# Patient Record
Sex: MALE | Race: WHITE | Employment: FULL TIME | URBAN - NONMETROPOLITAN AREA
[De-identification: names, ages, dates, MRNs, and addresses within clinical notes are randomized per-mention and may not be internally consistent; named-entity substitution may affect disease eponyms.]

---

## 2020-12-02 ENCOUNTER — HOSPITAL ENCOUNTER (EMERGENCY)
Age: 30
Discharge: HOME | End: 2020-12-02
Attending: EMERGENCY MEDICINE

## 2020-12-02 VITALS
HEART RATE: 72 BPM | WEIGHT: 145 LBS | BODY MASS INDEX: 21.98 KG/M2 | HEIGHT: 68 IN | TEMPERATURE: 97.8 F | OXYGEN SATURATION: 97 % | DIASTOLIC BLOOD PRESSURE: 85 MMHG | RESPIRATION RATE: 16 BRPM | SYSTOLIC BLOOD PRESSURE: 133 MMHG

## 2020-12-02 DIAGNOSIS — M54.32 SCIATICA OF LEFT SIDE: Primary | ICD-10-CM

## 2020-12-02 PROCEDURE — 99284 EMERGENCY DEPT VISIT MOD MDM: CPT

## 2020-12-02 RX ORDER — NAPROXEN 500 MG/1
500 TABLET ORAL 2 TIMES DAILY
Qty: 60 TABLET | Refills: 0 | Status: SHIPPED | OUTPATIENT
Start: 2020-12-02

## 2020-12-02 RX ORDER — METHYLPREDNISOLONE 4 MG/1
TABLET ORAL
Qty: 1 KIT | Refills: 0 | Status: SHIPPED | OUTPATIENT
Start: 2020-12-02 | End: 2020-12-08

## 2020-12-02 RX ORDER — CYCLOBENZAPRINE HCL 5 MG
5 TABLET ORAL 3 TIMES DAILY PRN
Qty: 30 TABLET | Refills: 0 | Status: SHIPPED | OUTPATIENT
Start: 2020-12-02 | End: 2020-12-12

## 2020-12-02 ASSESSMENT — PAIN DESCRIPTION - DESCRIPTORS: DESCRIPTORS: SHOOTING

## 2020-12-02 NOTE — ED NOTES
Discharge instructions and scripts given to pt. Instructed how to take the medications. Given information on drs in the area taking new pts and instructed to call and follow up with one of them. Instructed if he has any problems or concerns to return to ER if any problems or concerns. Pt verbalizes understanding. Pt discharged ambulatory.       Francisco Garcia RN  12/02/20 [id number]

## 2020-12-02 NOTE — ED PROVIDER NOTES
eMERGENCY dEPARTMENT eNCOUnter      PCP: No primary care provider on file. CHIEF COMPLAINT    Chief Complaint   Patient presents with    Leg Pain     states began 2 weeks ago with pain to his lower back. states it moved down to his leg and the past couple days has had pain in his entire leg. states worse in the calf and hip area       HPI    Kenny Hayden is a 30 y.o. male who presents with back pain, located in the left low back and leg region. The onset was about a week ago. Context is was playing basketball the day prior, thinks that he exacerbated it that way. Denies a specific injury, fall or accident. The duration has been constant since the onset. The quality of the pain is sharp. The pain does  radiate into the left hip, leg. The pain worsens with movement. States initially the pain was more in the low back, across the entire lower back. States that then left the low back, was in the left hip and leg where it is now. Patient with movements it feels like a lightening bolt sensation, caused tingling intermittently in the foot. No known alleviating factors. No bowel incontinence or bladder retention, No saddle anesthesia, No radicular symptoms. No extremity weakness, numbness. REVIEW OF SYSTEMS    Constitutional:  Denies fever, chills  Cardiovascular:  Denies chest pain, palpitations or swelling  Respiratory:  Denies cough or shortness of breath    GI:  Denies abdominal pain, nausea, vomiting, or diarrhea  :  Denies any urinary symptoms. Musculoskeletal:  See HPI above   Skin:  Denies rash  Neurologic: Denies weakness, numbness. Endocrine:  Denies polyuria or polydypsia   Lymphatic:  Denies swollen glands     All other review of systems are negative  See HPI and nursing notes for additional information       PAST MEDICAL & SURGICAL HISTORY    History reviewed. No pertinent past medical history. History reviewed. No pertinent surgical history.     CURRENT MEDICATIONS        ALLERGIES    No Known Allergies    SOCIAL HISTORY    Social History     Socioeconomic History    Marital status: Single     Spouse name: None    Number of children: None    Years of education: None    Highest education level: None   Occupational History    None   Social Needs    Financial resource strain: None    Food insecurity     Worry: None     Inability: None    Transportation needs     Medical: None     Non-medical: None   Tobacco Use    Smoking status: Never Smoker    Smokeless tobacco: Never Used   Substance and Sexual Activity    Alcohol use: Not Currently    Drug use: Never    Sexual activity: None   Lifestyle    Physical activity     Days per week: None     Minutes per session: None    Stress: None   Relationships    Social connections     Talks on phone: None     Gets together: None     Attends [employer] service: None     Active member of club or organization: None     Attends meetings of clubs or organizations: None     Relationship status: None    Intimate partner violence     Fear of current or ex partner: None     Emotionally abused: None     Physically abused: None     Forced sexual activity: None   Other Topics Concern    None   Social History Narrative    None       PHYSICAL EXAM    VITAL SIGNS: /85   Pulse 72   Temp 97.8 °F (36.6 °C) (Oral)   Resp 16   Ht 5' 8\" (1.727 m)   Wt 145 lb (65.8 kg)   SpO2 97%   BMI 22.05 kg/m²        Constitutional:  Awake, alert, in no acute distress. HENT:  Atraumatic,  Moist mucus membranes. Normal posterior pharynx. Eyes:  Pupils equal round and reactive to light, EOM intact. Neck: Supple, normal ROM. Cardiovascular:  Regular rate and rhythm, no murmurs/rubs/gallops  Respiratory:  No respiratory distress, clear to auscultation bilaterally. Abdomen:  Soft, non tender, bowel sounds present  Musculoskeletal:  No edema, no deformities. Back:   - No gross deformity, swelling, or discoloration.     - no Paralumbar tenderness without masses, fluctuance, warmth, or skin changes.  - No localized midline bony tenderness.   - No change in pain with forward flexion  - SLR test negative   - No CVA tenderness to percussion  - Thigh and groin sensation is intact, able to abduct the thigh, extend the knee, flex the knee, dorsiflex the ankle, plantar flex the toes, dorsiflex the great toe, patellar and achilles reflexes are intact bilaterally  Integument:  Well hydrated, no rash, no pallor. Neurologic:  No obvious neurological deficits. Patient moves without difficulty or weakness. Motor & Sensation grossly intact. Vascular:  Distal pulses and capillary refill intact bilateral lower extremities            ED COURSE & MEDICAL DECISION MAKING       Vital signs and nursing notes reviewed during ED course. I have independently evaluated this patient. All pertinent Lab data and radiographic results reviewed with patient at bedside. The patient and/or the family were informed of the results of any tests/labs/imaging, the treatment plan, and time was allotted to answer questions. 64-year-old male who presented with left leg pain. He did have back pain prior to this, does sound consistent with sciatica. No red flag signs or symptoms. He is well-appearing, nontoxic. Neurovascularly intact. Do feel that discharge is reasonable with symptomatic care and outpatient follow-up with her primary care provided. We did discuss that if symptoms continue despite symptomatic, typical treatments he should seek reevaluation with a primary care provider, possibly imaging studies such as MRI if needed. Instructed return with new or worsening signs or symptoms. He voiced understanding the discharge instructions and return precautions.     Red flags: denies  Minor: alcohol abuse, DM, renal failure, night pain  Major: IVDA, fever, systemic infection, immunosuppression, recent spinal fracture or procedure, incontinence or retention, indwelling urinary catheter     Differential Diagnosis: Epidural Abscess, Abdominal Aortic Aneurysm, Metastases to back, Cauda Equina Syndrome, Kidney stone, Pyelonephritis, other    The likelihood of other entities in the differential is insufficient to justify any further testing for them. This was explained to the patient. The patient was advised that persistent or worsening symptoms would require further evaluation. Clinical  IMPRESSION    Sciatica    Diagnosis and plan discussed in detail with patient who understands and agrees. Patient agrees to return emergency department if symptoms worsen or any new symptoms develop.       (Please note the MDM and HPI sections of this note were completed with a voice recognition program.  Efforts were made to edit the dictations but occasionally words are mis-transcribed.)      Socorro Garcia,   12/02/20 [id number]